# Patient Record
Sex: MALE | Race: WHITE | Employment: UNEMPLOYED | URBAN - METROPOLITAN AREA
[De-identification: names, ages, dates, MRNs, and addresses within clinical notes are randomized per-mention and may not be internally consistent; named-entity substitution may affect disease eponyms.]

---

## 2024-04-28 ENCOUNTER — HOSPITAL ENCOUNTER (OUTPATIENT)
Age: 49
Discharge: HOME | End: 2024-04-28
Payer: COMMERCIAL

## 2024-04-28 VITALS
RESPIRATION RATE: 18 BRPM | DIASTOLIC BLOOD PRESSURE: 72 MMHG | HEART RATE: 80 BPM | SYSTOLIC BLOOD PRESSURE: 113 MMHG | TEMPERATURE: 98 F | OXYGEN SATURATION: 94 %

## 2024-04-28 DIAGNOSIS — H53.9 VISUAL CHANGES: Primary | ICD-10-CM

## 2024-04-28 PROCEDURE — 99203 OFFICE O/P NEW LOW 30 MIN: CPT | Performed by: PHYSICIAN ASSISTANT

## 2024-04-28 RX ORDER — ISOTRETINOIN 40 MG/1
CAPSULE, LIQUID FILLED ORAL
COMMUNITY
Start: 2023-11-17

## 2024-04-28 NOTE — ED INITIAL ASSESSMENT (HPI)
Patient states that when he woke up this morning he had a blurry/ hazy view out of his left eye, that has minimally got better but thinks that it may be a side effect of one of his new medication clarvis. Some light sensitivity

## 2024-04-28 NOTE — ED PROVIDER NOTES
Patient Seen in: [facility]      History   No chief complaint on file.    Stated Complaint: Eye problem    Subjective:   HPI    Patient is a 48-year-old male that presents to immediate care due to left eye visual changes.  Patient states that he woke up this morning and his left eye has been more blurry than normal.  Concern for side effect of medication.  Denies eye pain pressure or eye injury headache neck pain. States symptoms have been rapidly improving.     Objective:   History reviewed. No pertinent past medical history.           Past Surgical History:   Procedure Laterality Date    [place] teeth removed                  Social History     Socioeconomic History    Marital status:    Tobacco Use    Smoking status: Never     Passive exposure: Never    Smokeless tobacco: Never   Vaping Use    Vaping status: Never Used   Substance and Sexual Activity    Alcohol use: Never    Drug use: Never              Review of Systems    Positive for stated complaint: Eye problem  Other systems are as noted in HPI.  Constitutional and vital signs reviewed.      All other systems reviewed and negative except as noted above.    Physical Exam     ED Triage Vitals [04/28/24 1007]   /72   Pulse 80   Resp 18   Temp 97.9 °F (36.6 °C)   Temp src Temporal   SpO2 94 %   O2 Device None (Room air)       Current:/72   Pulse 80   Temp 97.9 °F (36.6 °C) (Temporal)   Resp 18   SpO2 94%     Right Eye Chart Acuity: 20/25, Corrected  Left Eye Chart Acuity: 20/30, Corrected    Physical Exam    Vital signs reviewed. Nursing note reviewed.  Constitutional: Well-developed. Well-nourished. In no acute distress  HENT: Mucous membranes moist.   EYES: PERRL. EOMI. Visual fields in tact. Visual acuity grossly normal  NECK: Supple.   CARDIAC: Normal rate. Normal S1/ S2. 2+ distal pulses. No edema  PULM/CHEST: Clear to auscultation bilaterally. No wheezes  ABD: Soft, non-tender, non-distended  : No CVA  tenderness.  RECTAL: deferred  Extremities: Full ROM  NEURO: Alert. CN II-XII intact. 5/5 strength in the upper and lower extremities. Sensation intact to light touch. Normal finger to nose test. Normal gait.   SKIN: Warm and dry. No rash or lesions.  PSYCH: Normal judgment. Normal affect.                       MDM      Patient is a 48-year-old male who presents to immediate care due to left eye blurry vision that began this morning.  Patient arrives with stable vitals, visual acuity intact however continues to notes mild blurred vision.  Less likely TIA, CVA as patient is neurovascularly intact. less likely retinal detachment.  Possible side effect from isotretinoin encourage patient to discontinue medication and follow-up with dermatology as soon as possible.  Will additionally give patient ophthalmology follow-up.  ED precautions including worsening symptoms, vision loss.                                   Medical Decision Making      Disposition and Plan     Clinical Impression:  1. Visual changes         Disposition:  Discharge  4/28/2024 10:27 am    Follow-up:  Saravanan Thrasher MD  [address]  [address]  [facility] [address]  [phone]    Schedule an appointment as soon as possible for a visit             Medications Prescribed:  Discharge Medication List as of 4/28/2024 10:27 AM

## 2024-07-27 ENCOUNTER — HOSPITAL ENCOUNTER (OUTPATIENT)
Age: 49
Discharge: HOME | End: 2024-07-27
Payer: COMMERCIAL

## 2024-07-27 ENCOUNTER — APPOINTMENT (OUTPATIENT)
Dept: GENERAL RADIOLOGY | Age: 49
End: 2024-07-27
Payer: COMMERCIAL

## 2024-07-27 VITALS
HEART RATE: 84 BPM | OXYGEN SATURATION: 94 % | RESPIRATION RATE: 18 BRPM | TEMPERATURE: 98 F | SYSTOLIC BLOOD PRESSURE: 120 MMHG | DIASTOLIC BLOOD PRESSURE: 74 MMHG

## 2024-07-27 DIAGNOSIS — R55 VASO VAGAL EPISODE: ICD-10-CM

## 2024-07-27 DIAGNOSIS — R05.8 POST-VIRAL COUGH SYNDROME: Primary | ICD-10-CM

## 2024-07-27 DIAGNOSIS — R05.2 SUBACUTE COUGH: ICD-10-CM

## 2024-07-27 LAB
#MXD IC: 0.9 X10ˆ3/UL (ref 0.1–1)
BUN BLD-MCNC: 22 MG/DL (ref 7–18)
CHLORIDE BLD-SCNC: 103 MMOL/L (ref 98–112)
CO2 BLD-SCNC: 25 MMOL/L (ref 21–32)
CREAT BLD-MCNC: 0.9 MG/DL
EGFRCR SERPLBLD CKD-EPI 2021: 105 ML/MIN/1.73M2 (ref 60–?)
GLUCOSE BLD-MCNC: 135 MG/DL (ref 70–99)
HCT VFR BLD AUTO: 46.8 %
HCT VFR BLD CALC: 49 %
HGB BLD-MCNC: 15.2 G/DL
ISTAT IONIZED CALCIUM FOR CHEM 8: 1.14 MMOL/L (ref 1.12–1.32)
LYMPHOCYTES # BLD AUTO: 4 X10ˆ3/UL (ref 1–4)
LYMPHOCYTES NFR BLD AUTO: 40.5 %
MCH RBC QN AUTO: 29.2 PG (ref 26–34)
MCHC RBC AUTO-ENTMCNC: 32.5 G/DL (ref 31–37)
MCV RBC AUTO: 89.8 FL (ref 80–100)
MIXED CELL %: 8.7 %
NEUTROPHILS # BLD AUTO: 5 X10ˆ3/UL (ref 1.5–7.7)
NEUTROPHILS NFR BLD AUTO: 50.8 %
PLATELET # BLD AUTO: 392 X10ˆ3/UL (ref 150–450)
POTASSIUM BLD-SCNC: 4 MMOL/L (ref 3.6–5.1)
RBC # BLD AUTO: 5.21 X10ˆ6/UL
SODIUM BLD-SCNC: 137 MMOL/L (ref 136–145)
WBC # BLD AUTO: 9.9 X10ˆ3/UL (ref 4–11)

## 2024-07-27 PROCEDURE — 80047 BASIC METABLC PNL IONIZED CA: CPT

## 2024-07-27 PROCEDURE — 99214 OFFICE O/P EST MOD 30 MIN: CPT

## 2024-07-27 PROCEDURE — 93000 ELECTROCARDIOGRAM COMPLETE: CPT

## 2024-07-27 PROCEDURE — 85025 COMPLETE CBC W/AUTO DIFF WBC: CPT

## 2024-07-27 PROCEDURE — 71046 X-RAY EXAM CHEST 2 VIEWS: CPT

## 2024-07-27 RX ORDER — BENZONATATE 100 MG/1
100 CAPSULE ORAL 3 TIMES DAILY PRN
Qty: 30 CAPSULE | Refills: 0 | Status: SHIPPED | OUTPATIENT
Start: 2024-07-27 | End: 2024-08-26

## 2024-07-27 RX ORDER — BENZONATATE 200 MG/1
CAPSULE ORAL
COMMUNITY
Start: 2024-07-19

## 2024-07-27 RX ORDER — FLUTICASONE PROPIONATE 50 MCG
2 SPRAY, SUSPENSION (ML) NASAL DAILY
COMMUNITY
Start: 2023-12-11 | End: 2024-08-18

## 2024-07-27 RX ORDER — CODEINE PHOSPHATE AND GUAIFENESIN 10; 100 MG/5ML; MG/5ML
5 SOLUTION ORAL EVERY 6 HOURS PRN
Qty: 118 ML | Refills: 0 | Status: SHIPPED | OUTPATIENT
Start: 2024-07-27

## 2024-07-27 NOTE — DISCHARGE INSTRUCTIONS
Your EKG, chest x-ray and lab work were all normal.  Your symptoms are consistent with a vasovagal reaction as we discussed.  If you develop any chest pain, headache, dizziness, weakness or have another fainting episode you need to go to the emergency department.    Your chest x-ray did not show any signs of pneumonia.  Your COVID is likely a postviral cough syndrome.  This can last for a number of weeks after a upper respiratory illness.  I sent you a prescription for Tessalon Perles for your cough.  You can also use Flonase and Mucinex for the postnasal drip.  If you have persistent symptoms beyond the next week or so you should make an appointment to follow-up with your primary care provider.    Your blood pressure was elevated today.  You should monitor your blood pressure at home and keep a journal of your readings.  Please be sure to make an appointment to follow-up with your primary care doctor to discuss this further.

## 2024-07-27 NOTE — ED INITIAL ASSESSMENT (HPI)
Patient is here with a lingering cough that he has had for the last three weeks.  He states early this morning he coughed so hard he passed out.

## 2024-07-27 NOTE — ED PROVIDER NOTES
Patient Seen in: [facility]      History     Chief Complaint   Patient presents with    Cough/URI     Stated Complaint: Cough  Subjective:   Eliezer is a 48-year-old male presenting to the immediate care complaining of a cough for the last 3 weeks.  Patient states that he had a cold at the beginning of symptoms and is feeling better but still has some persistent postnasal drip and cough.  Patient denies any fever, chest pain, shortness of breath, abdominal pain or vomiting.  Patient states that he gets some severe coughing episodes where he has to spit up a bit of phlegm.  States that this morning he had a coughing episode that caused him to \"pass out\".  States he was walking to the bathroom when he was coughing so hard that he fell to the ground.  Patient remembers the entire event.  States during the coughing episode he had some increased pressure in his throat just prior to falling to the ground.  No head injury.  No loss of consciousness.  No head, neck or back pain.  Patient states that prior to or following the fall he has not had any chest pain, dizziness, headache, blurred vision or shortness of breath.  He is eating and drinking well and is well-hydrated.  He denies any other concerns or complaints.        Objective:   History reviewed. No pertinent past medical history.         Past Surgical History:   Procedure Laterality Date    [place] teeth removed                Social History     Socioeconomic History    Marital status:    Tobacco Use    Smoking status: Never     Passive exposure: Never    Smokeless tobacco: Never   Vaping Use    Vaping status: Never Used   Substance and Sexual Activity    Alcohol use: Never    Drug use: Never            Review of Systems    Positive for stated complaint: Cough/URI    Other systems are as noted in HPI.  Constitutional and vital signs reviewed.      All other systems reviewed and negative except as noted above.    Physical Exam     ED Triage  Vitals [07/27/24 0912]   BP (!) 141/93   Pulse 87   Resp 16   Temp 98.2 °F (36.8 °C)   Temp src Temporal   SpO2 95 %   O2 Device None (Room air)     Current:/74   Pulse 84   Temp 98.2 °F (36.8 °C) (Temporal)   Resp 18   SpO2 94%     Physical Exam  Vitals and nursing note reviewed.   Constitutional:       General: He is not in acute distress.     Appearance: Normal appearance. He is not ill-appearing, toxic-appearing or diaphoretic.   HENT:      Head: Normocephalic.      Right Ear: Tympanic membrane, ear canal and external ear normal.      Left Ear: Tympanic membrane, ear canal and external ear normal.      Nose: Congestion present.      Mouth/Throat:      Mouth: Mucous membranes are moist.      Pharynx: Oropharynx is clear.   Eyes:      Conjunctiva/sclera: Conjunctivae normal.   Cardiovascular:      Rate and Rhythm: Normal rate and regular rhythm.      Pulses: Normal pulses.      Heart sounds: Normal heart sounds.   Pulmonary:      Effort: Pulmonary effort is normal. No tachypnea, bradypnea, accessory muscle usage, prolonged expiration, respiratory distress or retractions.      Breath sounds: Normal breath sounds and air entry. No stridor or decreased air movement. No decreased breath sounds, wheezing, rhonchi or rales.   Abdominal:      General: Abdomen is flat.   Musculoskeletal:         General: Normal range of motion.      Cervical back: Normal range of motion.      Right lower leg: No edema.      Left lower leg: No edema.   Skin:     General: Skin is warm.      Capillary Refill: Capillary refill takes less than 2 seconds.   Neurological:      General: No focal deficit present.      Mental Status: He is alert and oriented to person, place, and time.      Sensory: No sensory deficit.      Motor: No weakness.      Coordination: Coordination normal.      Gait: Gait normal.   Psychiatric:         Mood and Affect: Mood normal.         Behavior: Behavior normal.         Thought Content: Thought content  normal.         Judgment: Judgment normal.         ED Course   Radiology:  XR CHEST PA + LAT CHEST (CPT=71046)    Result Date: 7/27/2024  CONCLUSION:  1. No acute finding.    Dictated by (CST): Terry Vallecillo MD on 7/27/2024 at 10:13 AM     Finalized by (CST): Terry Vallecillo MD on 7/27/2024 at 10:14 AM         Labs Reviewed   POCT ISTAT CHEM8 CARTRIDGE - Abnormal; Notable for the following components:       Result Value    ISTAT BUN 22 (*)     ISTAT Glucose 135 (*)     All other components within normal limits   POCT CBC     EKG    Rate, intervals and axes as noted on EKG Report.  Rate: 77  Rhythm: Sinus Rhythm  Reading: Normal sinus rhythm.  No previous EKGs for comparison.           MDM     Medical Decision Making  Differential diagnoses reflecting the complexity of care include but are not limited to vasovagal event, pneumonia, URI, postviral cough, less likely ACS or PE.    Comorbidities that add complexity to management include: Sleep apnea  History obtained by an independent source was from: Patient  My independent interpretations of studies include: EKG, chest x-ray  Patient is well appearing, non-toxic and in no acute distress.  Vital signs are stable.   Patient's history and physical exam are consistent with a postviral cough that caused a vasovagal episode.  Patient describes a very brief episode where he fainted this morning while he was coughing very hard.  Did not have a loss of consciousness, remembers the entire event.  No head, neck or back injury.  Patient denied any chest pain, shortness of breath, dizziness or severe headache prior to, during or after the incident.  States he feels better now.  PERC negative.  EKG was normal.  Chest x-ray did not show any signs of pneumonia or other acute illness.  CBC and electrolytes were normal.  I discussed the mechanism of vasovagal responses with the patient.  I did recommend that if he has another syncopal episode, develops any chest pain, shortness of  breath, dizziness, headache or blurred vision that he go to the emergency department.  I sent you a prescription for Tessalon Perles for the cough.  Patient states he has been using those at home with minimal relief.  I also sent a prescription for Cheratussin with drowsiness precautions.  Recommended that if patient has persistent symptoms more than the next week that he make an appointment to follow-up with his primary care provider.  Patient's blood pressure noted to be elevated today.  No red flag hypertension symptoms.  Recommended the patient follow-up with primary care provider to discuss.    ED precautions discussed.  Patient (guardian) advised to follow up with PCP in 2-3 days.  Patient (guardian) agrees with this plan of care.  Patient (guardian) verbalizes understanding of discharge instructions and plan of care.  Patient discussed with Dr. Almanzar on the phone who agrees with this plan.      Amount and/or Complexity of Data Reviewed  Labs: ordered. Decision-making details documented in ED Course.  Radiology: ordered and independent interpretation performed. Decision-making details documented in ED Course.  ECG/medicine tests: ordered and independent interpretation performed. Decision-making details documented in ED Course.    Risk  OTC drugs.  Prescription drug management.        Disposition and Plan     Clinical Impression:  1. Post-viral cough syndrome    2. Subacute cough    3. Vaso vagal episode         Disposition:  Discharge  7/27/2024 10:20 am    Follow-up:  Nonstaff, Physician                Medications Prescribed:  Discharge Medication List as of 7/27/2024 10:20 AM        START taking these medications    Details   !! benzonatate 100 MG Oral Cap Take 1 capsule (100 mg total) by mouth 3 (three) times daily as needed for cough., Normal, Disp-30 capsule, R-0       !! - Potential duplicate medications found. Please discuss with provider.

## 2024-07-28 LAB
ATRIAL RATE: 77 BPM
P AXIS: 69 DEGREES
P-R INTERVAL: 158 MS
Q-T INTERVAL: 360 MS
QRS DURATION: 84 MS
QTC CALCULATION (BEZET): 407 MS
R AXIS: 55 DEGREES
T AXIS: 49 DEGREES
VENTRICULAR RATE: 77 BPM

## 2024-12-12 ENCOUNTER — APPOINTMENT (OUTPATIENT)
Dept: GENERAL RADIOLOGY | Age: 49
End: 2024-12-12
Attending: NURSE PRACTITIONER
Payer: COMMERCIAL

## 2024-12-12 ENCOUNTER — HOSPITAL ENCOUNTER (OUTPATIENT)
Age: 49
Discharge: HOME | End: 2024-12-12
Payer: COMMERCIAL

## 2024-12-12 VITALS
DIASTOLIC BLOOD PRESSURE: 85 MMHG | HEART RATE: 88 BPM | OXYGEN SATURATION: 97 % | SYSTOLIC BLOOD PRESSURE: 147 MMHG | RESPIRATION RATE: 18 BRPM | TEMPERATURE: 99 F

## 2024-12-12 DIAGNOSIS — R05.1 ACUTE COUGH: Primary | ICD-10-CM

## 2024-12-12 DIAGNOSIS — J06.9 UPPER RESPIRATORY TRACT INFECTION, UNSPECIFIED TYPE: ICD-10-CM

## 2024-12-12 PROCEDURE — 71046 X-RAY EXAM CHEST 2 VIEWS: CPT | Performed by: NURSE PRACTITIONER

## 2024-12-12 PROCEDURE — 99213 OFFICE O/P EST LOW 20 MIN: CPT | Performed by: NURSE PRACTITIONER

## 2024-12-12 RX ORDER — METHYLPREDNISOLONE 4 MG/1
TABLET ORAL
Qty: 1 EACH | Refills: 0 | Status: SHIPPED | OUTPATIENT
Start: 2024-12-12

## 2024-12-13 NOTE — ED PROVIDER NOTES
Patient Seen in: [facility]      History   No chief complaint on file.    Stated Complaint: Cough    Subjective:   HPI      48yo male p/w cough \"over a week, my wife had the same cough but she got over it and I can't.\"  Denies f/c/n/v/d. No recent sick exposures. Denies recent infections/covid exposures.  Has taken albuterol inhaler, Tessalon Perles with minimal relief.    Objective:     History reviewed. No pertinent past medical history.           Past Surgical History:   Procedure Laterality Date    [place] teeth removed                  Social History     Socioeconomic History    Marital status:    Tobacco Use    Smoking status: Never     Passive exposure: Never    Smokeless tobacco: Never   Vaping Use    Vaping status: Never Used   Substance and Sexual Activity    Alcohol use: Never    Drug use: Never              Review of Systems    Positive for stated complaint: Cough  Other systems are as noted in HPI.  Constitutional and vital signs reviewed.      All other systems reviewed and negative except as noted above.    Physical Exam     ED Triage Vitals [12/12/24 1750]   /85   Pulse 88   Resp 18   Temp 98.8 °F (37.1 °C)   Temp src Oral   SpO2 97 %   O2 Device None (Room air)       Current Vitals:   Vital Signs  BP: 147/85  Pulse: 88  Resp: 18  Temp: 98.8 °F (37.1 °C)  Temp src: Oral    Oxygen Therapy  SpO2: 97 %  O2 Device: None (Room air)        Physical Exam  Vitals and nursing note reviewed.   HENT:      Head: Normocephalic.      Right Ear: Tympanic membrane normal.      Left Ear: Tympanic membrane normal.      Nose: Nose normal.      Mouth/Throat:      Mouth: Mucous membranes are moist.   Eyes:      Pupils: Pupils are equal, round, and reactive to light.   Cardiovascular:      Rate and Rhythm: Normal rate and regular rhythm.   Pulmonary:      Effort: Pulmonary effort is normal. No respiratory distress.      Breath sounds: No stridor. Rhonchi present. No wheezing or rales.   Chest:       Chest wall: No tenderness.   Abdominal:      General: There is no distension.      Tenderness: There is no abdominal tenderness.   Musculoskeletal:         General: Normal range of motion.      Cervical back: Normal range of motion.   Skin:     General: Skin is warm and dry.   Neurological:      Mental Status: He is alert.             ED Course   Labs Reviewed - No data to display    ED Course as of 12/12/24 2012  ------------------------------------------------------------  Time: 12/12 1858  Value: XR CHEST PA + LAT CHEST (CPT=71046)  Comment: Stable chest without radiographically evident acute intrathoracic process.              [facility]              Medical Decision Making  48yo male p/w cough x 7 days. Infectious PNA v viral process, URI.     Xray of chest >I have directly viewed this exam, Negative per my read for acute fracture/dislocation.Xray>XR CHEST PA + LAT CHEST (CPT=71046)    Result Date: 12/12/2024  CONCLUSION:  Stable chest without radiographically evident acute intrathoracic process.    Dictated by (CST): Dieter Huitron MD on 12/12/2024 at 6:41 PM     Finalized by (CST): Dieter Huitron MD on 12/12/2024 at 6:42 PM        O2 sat is 97% on room air no increased work with breathing, patient in full sentences.  Insurance does not cover Robitussin with codeine as discussed with patient.  Will place on Medrol Dosepak.  Has Tessalon Perles and albuterol at home if needed.      Physical exam remained stable over serial reexaminations as previously documented. External chart review completed. No recent hospitalizations for the same.      I have discussed with the patient the results of tests, differential diagnosis, and warning signs and symptoms that should prompt immediate return.  The patient understands these instructions and agrees to the follow-up plan provided.  There are no barriers to learning.   Appropriate f/u given.  Patient agrees to return for any  concerns/problems/complications.    Differential diagnosis reflecting the complexity of care include: URI, PNA, bronchitits    Comorbidities that add complexity to management include:na    External chart review was done and was noted:Yes    History obtained by an independent source was from: Patient/Wife//Parent    Discussions of management was done with:Patient    My independent interpretation of studies of:Xray    Diagnostic tests and medications considered but not ordered were:na    Social determinants of health that affect care:NA    Shared decision making was done by Self, Patient          Disposition and Plan     Clinical Impression:  1. Acute cough    2. Upper respiratory tract infection, unspecified type         Disposition:  Discharge  12/12/2024  7:01 pm    Follow-up:  Chema Nguyen MD  [address] [facility] [address]  [phone]                Medications Prescribed:  Discharge Medication List as of 12/12/2024  7:05 PM        START taking these medications    Details   methylPREDNISolone (MEDROL) 4 MG Oral Tablet Therapy Pack Dosepack: take as directed, Normal, Disp-1 each, R-0                 Supplementary Documentation:

## 2024-12-13 NOTE — DISCHARGE INSTRUCTIONS
Cough, Adult  Coughing is a reflex that clears your throat and airways (respiratory system). It helps heal and protect your lungs. It is normal to cough from time to time. A cough that happens with other symptoms or that lasts a long time may be a sign of a condition that needs treatment. A short-term (acute) cough may only last 2-3 weeks. A long-term (chronic) cough may last 8 or more weeks.    Coughing is often caused by:  Diseases, such as:  An infection of the respiratory system.  Asthma or other heart or lung diseases.  Gastroesophageal reflux. This is when acid comes back up from the stomach.  Breathing in things that irritate your lungs.  Allergies.  Postnasal drip. This is when mucus runs down the back of your throat.  Smoking.  Some medicines.  Follow these instructions at home:  Medicines    Take over-the-counter and prescription medicines only as told by your health care provider.  Talk with your provider before you take cough medicine (cough suppressants).  Eating and drinking    Do not drink alcohol.  Avoid caffeine.  Drink enough fluid to keep your pee (urine) pale yellow.  Lifestyle    Avoid cigarette smoke.  Do not use any products that contain nicotine or tobacco. These products include cigarettes, chewing tobacco, and vaping devices, such as e-cigarettes. If you need help quitting, ask your provider.  Avoid things that make you cough. These may include perfumes, candles, cleaning products, or campfire smoke.  General instructions    A person holding a cloth over the mouth and nose while coughing.  Watch for any changes to your cough. Tell your provider about them.  Always cover your mouth when you cough.  If the air is dry in your bedroom or home, use a cool mist vaporizer or humidifier.  If your cough is worse at night, try to sleep in a semi-upright position.  Rest as needed.  Contact a health care provider if:  You have new symptoms, or your symptoms get worse.  You cough up pus.  You have a  fever that does not go away or a cough that does not get better after 2-3 weeks.  You cannot control your cough with medicine, and you are losing sleep.  You have pain that gets worse or is not helped with medicine.  You lose weight for no clear reason.  You have night sweats.  Get help right away if:  You cough up blood.  You have trouble breathing.  Your heart is beating very fast.

## 2025-01-13 ENCOUNTER — HOSPITAL ENCOUNTER (OUTPATIENT)
Age: 50
Discharge: HOME | End: 2025-01-13
Payer: COMMERCIAL

## 2025-01-13 VITALS
RESPIRATION RATE: 12 BRPM | HEART RATE: 88 BPM | DIASTOLIC BLOOD PRESSURE: 76 MMHG | TEMPERATURE: 98 F | OXYGEN SATURATION: 97 % | SYSTOLIC BLOOD PRESSURE: 118 MMHG

## 2025-01-13 DIAGNOSIS — R05.3 CHRONIC COUGH: Primary | ICD-10-CM

## 2025-01-13 RX ORDER — ALBUTEROL SULFATE 90 UG/1
2 INHALANT RESPIRATORY (INHALATION) EVERY 4 HOURS PRN
Qty: 1 EACH | Refills: 0 | Status: SHIPPED | OUTPATIENT
Start: 2025-01-13 | End: 2025-02-12

## 2025-01-13 RX ORDER — ROPINIROLE 0.25 MG/1
0.25 TABLET, FILM COATED ORAL NIGHTLY
COMMUNITY
Start: 2025-01-07

## 2025-01-13 RX ORDER — FLUTICASONE PROPIONATE AND SALMETEROL 250; 50 UG/1; UG/1
1 POWDER RESPIRATORY (INHALATION) 2 TIMES DAILY
Qty: 60 EACH | Refills: 0 | Status: SHIPPED | OUTPATIENT
Start: 2025-01-13

## 2025-01-14 ENCOUNTER — HOSPITAL ENCOUNTER (OUTPATIENT)
Age: 50
Discharge: HOME | End: 2025-01-14
Payer: COMMERCIAL

## 2025-01-14 VITALS
OXYGEN SATURATION: 95 % | HEART RATE: 118 BPM | SYSTOLIC BLOOD PRESSURE: 146 MMHG | HEIGHT: 69 IN | WEIGHT: 225 LBS | TEMPERATURE: 102 F | BODY MASS INDEX: 33.33 KG/M2 | RESPIRATION RATE: 18 BRPM | DIASTOLIC BLOOD PRESSURE: 82 MMHG

## 2025-01-14 DIAGNOSIS — R50.9 FEVER IN ADULT: ICD-10-CM

## 2025-01-14 DIAGNOSIS — J10.1 INFLUENZA A: Primary | ICD-10-CM

## 2025-01-14 DIAGNOSIS — Z20.828 EXPOSURE TO INFLUENZA: ICD-10-CM

## 2025-01-14 LAB
POCT INFLUENZA A: POSITIVE
POCT INFLUENZA B: NEGATIVE

## 2025-01-14 RX ORDER — BENZONATATE 200 MG/1
200 CAPSULE ORAL 3 TIMES DAILY PRN
Qty: 30 CAPSULE | Refills: 0 | Status: SHIPPED | OUTPATIENT
Start: 2025-01-14

## 2025-01-14 RX ORDER — OSELTAMIVIR PHOSPHATE 75 MG/1
75 CAPSULE ORAL 2 TIMES DAILY
Qty: 10 CAPSULE | Refills: 0 | Status: SHIPPED | OUTPATIENT
Start: 2025-01-14

## 2025-01-14 NOTE — ED INITIAL ASSESSMENT (HPI)
Pt presents to the IC with c/o chills since last night, cough, body aches, nasal congestion and headache. +exposure to the flu at home.

## 2025-01-15 NOTE — ED PROVIDER NOTES
Patient Seen in: [facility]      History     Chief Complaint   Patient presents with    Cough/URI     Stated Complaint: Cough; Chills    Subjective:   HPI      Patient is a 49-year-old male with chronic cough, obstructive sleep apnea, presenting to immediate care for flu testing.  States son was seen here yesterday and tested positive for flu.  Last night developed chills with bodyaches, nasal congestion, and headache.  Today developed fever.  Took Advil for fever several hours ago.  Febrile 101.8 Fahrenheit in clinic.  Denies any chest pain or shortness of breath.  No weakness or confusion.  Not immunocompromise.  Saw pulmonologist today.  Did not have active fever.  Had normal lung exam.  Review of chart was seen yesterday in our clinic for chronic cough.  Recommended albuterol inhaler and long-acting inhaler.  No COVID/flu testing was performed yesterday's visit.    Objective:     Past Medical History:    Hyperlipidemia    Insomnia    Restless leg              Past Surgical History:   Procedure Laterality Date    [place] teeth removed                  Social History     Socioeconomic History    Marital status:    Tobacco Use    Smoking status: Never     Passive exposure: Never    Smokeless tobacco: Never   Vaping Use    Vaping status: Never Used   Substance and Sexual Activity    Alcohol use: Never    Drug use: Never              Review of Systems   Constitutional:  Positive for chills. Negative for fever.   HENT:  Positive for congestion.    Eyes:  Negative for visual disturbance.   Respiratory:  Positive for cough. Negative for shortness of breath.    Cardiovascular:  Negative for chest pain.   Gastrointestinal:  Negative for diarrhea and vomiting.   Musculoskeletal:  Positive for myalgias. Negative for neck stiffness.   Skin:  Negative for rash.   Neurological:  Positive for headaches. Negative for dizziness, seizures, weakness and light-headedness.   Psychiatric/Behavioral:  Negative for  confusion.    All other systems reviewed and are negative.      Positive for stated complaint: Cough; Chills  Other systems are as noted in HPI.  Constitutional and vital signs reviewed.      All other systems reviewed and negative except as noted above.    Physical Exam     ED Triage Vitals [01/14/25 1802]   /82   Pulse 118   Resp 18   Temp (!) 101.8 °F (38.8 °C)   Temp src Oral   SpO2 95 %   O2 Device None (Room air)       Current Vitals:   Vital Signs  BP: 146/82  Pulse: 118  Resp: 18  Temp: (!) 101.8 °F (38.8 °C)  Temp src: Oral    Oxygen Therapy  SpO2: 95 %  O2 Device: None (Room air)        Physical Exam  Vitals and nursing note reviewed.   Constitutional:       General: He is not in acute distress.     Appearance: Normal appearance. He is obese. He is not ill-appearing, toxic-appearing or diaphoretic.   HENT:      Head: Normocephalic and atraumatic.      Right Ear: Tympanic membrane normal.      Left Ear: Tympanic membrane normal.      Nose: Congestion and rhinorrhea present.      Mouth/Throat:      Mouth: Mucous membranes are moist.      Pharynx: No oropharyngeal exudate or posterior oropharyngeal erythema.   Eyes:      Conjunctiva/sclera: Conjunctivae normal.   Cardiovascular:      Rate and Rhythm: Normal rate and regular rhythm.      Pulses: Normal pulses.   Pulmonary:      Effort: Pulmonary effort is normal. No respiratory distress.      Breath sounds: Normal breath sounds. No wheezing or rales.   Musculoskeletal:         General: No tenderness.      Cervical back: Normal range of motion and neck supple. No rigidity.   Skin:     Capillary Refill: Capillary refill takes less than 2 seconds.      Findings: No rash.   Neurological:      General: No focal deficit present.      Mental Status: He is alert.      Motor: No weakness.      Gait: Gait normal.   Psychiatric:         Mood and Affect: Mood normal.         Behavior: Behavior normal.           ED Course     Labs Reviewed   POCT FLU TEST - Abnormal;  Notable for the following components:       Result Value    POCT INFLUENZA A Positive (*)     All other components within normal limits    Narrative:     This assay is a rapid molecular in vitro test utilizing nucleic acid amplification of influenza A and B viral RNA.     Results for orders placed or performed during the hospital encounter of 01/14/25   POCT Flu Test    Collection Time: 01/14/25  6:03 PM    Specimen: Nares; Other   Result Value Ref Range    POCT INFLUENZA A Positive (A) Negative    POCT INFLUENZA B Negative Negative        MDM     Dx: Influenza A, Initial Encounter  Onset of symptoms: yesterday  Influenza A PCR positive  Overall well-appearing  Hemodynamically stable  Afebrile  Tolerating PO  Outpatient management  Supportive care  Rest  Oral hydration  Motrin/Tylenol as needed for pain/fever/myalgia  Tamiflu for influenza A-meets benefit window  OTC Mucinex and Tessalon for cough  Albuterol inhaler long-acting inhaler for bronchospasms/SOB/wheezing  Droplet and Isolation Precautions   PCP follow  Return precaution  Discharge instructions Influenza      Medical Decision Making      Disposition and Plan     Clinical Impression:  1. Influenza A    2. Exposure to influenza    3. Fever in adult         Disposition:  Discharge  1/14/2025  6:24 pm    Follow-up:  Saman Lin,                 Medications Prescribed:  Discharge Medication List as of 1/14/2025  6:25 PM        START taking these medications    Details   !! benzonatate 200 MG Oral Cap Take 1 capsule (200 mg total) by mouth 3 (three) times daily as needed for cough., Normal, Disp-30 capsule, R-0      oseltamivir (TAMIFLU) 75 MG Oral Cap Take 1 capsule (75 mg total) by mouth 2 (two) times daily., Normal, Disp-10 capsule, R-0       !! - Potential duplicate medications found. Please discuss with provider.              Supplementary Documentation:

## 2025-01-15 NOTE — ED PROVIDER NOTES
Patient Seen in: [facility]      History     Chief Complaint   Patient presents with    Cough     Stated Complaint: cough    Subjective:   HPI      49-year-old male presents with complaint of cough and congestion x 2 weeks states worsening within the past 48 hours.  Wife is also at the bedside states patient has had chronic cough \"times years\".  Has been seen more recently in December and related to cough states he was given Tessalon Perles, steroids which improved his cough for short period of time and then states he is continue to cough with the same quality.  Denies any wheezing/shortness of breath.  Had a follow-up appointment with his PCP did not discuss this with his primary care doctor at that time.  Does wear CPAP intermittently at night.    Denies f/c/n/v/d. No recent sick exposures. Denies recent infections/covid exposures.    Objective:     Past Medical History:    Hyperlipidemia    Insomnia    Restless leg              Past Surgical History:   Procedure Laterality Date    [place] teeth removed                  Social History     Socioeconomic History    Marital status:    Tobacco Use    Smoking status: Never     Passive exposure: Never    Smokeless tobacco: Never   Vaping Use    Vaping status: Never Used   Substance and Sexual Activity    Alcohol use: Never    Drug use: Never              Review of Systems    Positive for stated complaint: cough  Other systems are as noted in HPI.  Constitutional and vital signs reviewed.      All other systems reviewed and negative except as noted above.    Physical Exam     ED Triage Vitals [01/13/25 0833]   /76   Pulse 88   Resp 12   Temp 97.9 °F (36.6 °C)   Temp src Oral   SpO2 97 %   O2 Device None (Room air)       Current Vitals:   Vital Signs  BP: 118/76  Pulse: 88  Resp: 12  Temp: 97.9 °F (36.6 °C)  Temp src: Oral    Oxygen Therapy  SpO2: 97 %  O2 Device: None (Room air)        Physical Exam  Vitals and nursing note reviewed.   HENT:       Head: Normocephalic.      Right Ear: Tympanic membrane normal.      Left Ear: Tympanic membrane normal.      Nose: Nose normal.      Mouth/Throat:      Mouth: Mucous membranes are moist.   Eyes:      Pupils: Pupils are equal, round, and reactive to light.   Cardiovascular:      Rate and Rhythm: Normal rate and regular rhythm.   Pulmonary:      Effort: Pulmonary effort is normal. No respiratory distress.      Breath sounds: Normal breath sounds. No wheezing.      Comments: Cough with expiration.  No wheezing noted.  Abdominal:      General: There is no distension.      Tenderness: There is no abdominal tenderness.   Musculoskeletal:         General: Normal range of motion.      Cervical back: Normal range of motion.   Skin:     General: Skin is warm and dry.   Neurological:      Mental Status: He is alert.             ED Course   Labs Reviewed - No data to display                MDM             Medical Decision Making  49-year-old male presents with acute on chronic cough.  Is asking if steroids are appropriate again for this course.  Upon chart review, care everywhere is noted that I did prescribe him a course of Solu-Medrol.  He does have a history of prediabetes is not on any current oral or injectable diabetes medications states he controls his blood sugar with lifestyle modifications.  He has noted to have an A1c of 7.1 last month, and his last A1c was 8.3.  He was not aware of his current A1c, states his doctors are changing and is unsure who he is following up with in regards to his most recent lab work as he continues to have high cholesterol and his A1c is continue to be high.  In light of that discussed with patient and using shared decision making discussed that it would not be appropriate to redosed him oral steroids at this time.    Would like to trial him with albuterol inhaler every 4-6 hours while awake, with spacer.  Instead of doing oral steroids we discussed inhaled steroids in relation to his  cough.  Needs to follow-up either with pulmonary in regards to his CPAP, and his primary doctor in regards to his chronic cough.  Discussed that there are multiple reasons why patients have chronic cough including asthma, chronic bronchitis, acid reflux.  No new reasons nor infectious findings to repeat chest x-ray at this time.    Of note is also here with young son who tested positive for influenza A.  Patient is not exhibiting signs at this time.    Physical exam remained stable over serial reexaminations as previously documented. External chart review completed.     I have discussed with the patient the results of tests, differential diagnosis, and warning signs and symptoms that should prompt immediate return.  The patient understands these instructions and agrees to the follow-up plan provided.  There are no barriers to learning.   Appropriate f/u given.  Patient agrees to return for any concerns/problems/complications.    Differential diagnosis reflecting the complexity of care include: See above    Comorbidities that add complexity to management include: Morbid obesity, prediabetes, high cholesterol    External chart review was done and was noted:Yes    History obtained by an independent source was from: Patient/Wife    Discussions of management was done with:Patient    My independent interpretation of studies of: N/A    Diagnostic tests and medications considered but not ordered were: N/A    Social determinants of health that affect care:NA    Shared decision making was done by Self, Patient        Disposition and Plan     Clinical Impression:  1. Chronic cough         Disposition:  Discharge  1/13/2025  9:24 am    Follow-up:  Nonstaff, Physician                Medications Prescribed:  Discharge Medication List as of 1/13/2025  9:32 AM        START taking these medications    Details   albuterol 108 (90 Base) MCG/ACT Inhalation Aero Soln Inhale 2 puffs into the lungs every 4 (four) hours as needed for  Wheezing., Normal, Disp-1 each, R-0      fluticasone-salmeterol 250-50 MCG/ACT Inhalation Aerosol Powder, Breath Activated Inhale 1 puff into the lungs 2 (two) times daily., Normal, Disp-60 each, R-0                 Supplementary Documentation: